# Patient Record
Sex: FEMALE | Race: WHITE
[De-identification: names, ages, dates, MRNs, and addresses within clinical notes are randomized per-mention and may not be internally consistent; named-entity substitution may affect disease eponyms.]

---

## 2019-04-04 ENCOUNTER — HOSPITAL ENCOUNTER (EMERGENCY)
Dept: HOSPITAL 10 - FTE | Age: 34
Discharge: HOME | End: 2019-04-04
Payer: MEDICAID

## 2019-04-04 ENCOUNTER — HOSPITAL ENCOUNTER (EMERGENCY)
Dept: HOSPITAL 91 - FTE | Age: 34
Discharge: HOME | End: 2019-04-04
Payer: MEDICAID

## 2019-04-04 VITALS — RESPIRATION RATE: 16 BRPM | HEART RATE: 93 BPM | DIASTOLIC BLOOD PRESSURE: 65 MMHG | SYSTOLIC BLOOD PRESSURE: 123 MMHG

## 2019-04-04 VITALS
BODY MASS INDEX: 28.09 KG/M2 | BODY MASS INDEX: 28.09 KG/M2 | WEIGHT: 143.08 LBS | HEIGHT: 60 IN | WEIGHT: 143.08 LBS | HEIGHT: 60 IN

## 2019-04-04 DIAGNOSIS — R11.2: Primary | ICD-10-CM

## 2019-04-04 DIAGNOSIS — R10.84: ICD-10-CM

## 2019-04-04 LAB
ADD MAN DIFF?: NO
ADD UMIC: NO
ALANINE AMINOTRANSFERASE: 36 IU/L (ref 13–69)
ALBUMIN/GLOBULIN RATIO: 1.2
ALBUMIN: 4.7 G/DL (ref 3.3–4.9)
ALKALINE PHOSPHATASE: 120 IU/L (ref 42–121)
ANION GAP: 10 (ref 5–13)
ASPARTATE AMINO TRANSFERASE: 24 IU/L (ref 15–46)
BASOPHIL #: 0.1 10^3/UL (ref 0–0.1)
BASOPHILS %: 0.4 % (ref 0–2)
BILIRUBIN,DIRECT: 0 MG/DL (ref 0–0.2)
BILIRUBIN,TOTAL: 0.5 MG/DL (ref 0.2–1.3)
BLOOD UREA NITROGEN: 12 MG/DL (ref 7–20)
CALCIUM: 9.4 MG/DL (ref 8.4–10.2)
CARBON DIOXIDE: 28 MMOL/L (ref 21–31)
CHLORIDE: 104 MMOL/L (ref 97–110)
CREATININE: 0.49 MG/DL (ref 0.44–1)
EOSINOPHILS #: 0.1 10^3/UL (ref 0–0.5)
EOSINOPHILS %: 1.1 % (ref 0–7)
GLOBULIN: 3.9 G/DL (ref 1.3–3.2)
GLUCOSE: 116 MG/DL (ref 70–220)
HEMATOCRIT: 34.9 % (ref 37–47)
HEMOGLOBIN: 10.4 G/DL (ref 12–16)
IMMATURE GRANS #M: 0.06 10^3/UL (ref 0–0.03)
IMMATURE GRANS % (M): 0.5 % (ref 0–0.43)
LIPASE: 11 U/L (ref 23–300)
LYMPHOCYTES #: 1.3 10^3/UL (ref 0.8–2.9)
LYMPHOCYTES %: 11.6 % (ref 15–51)
MEAN CORPUSCULAR HEMOGLOBIN: 21.7 PG (ref 29–33)
MEAN CORPUSCULAR HGB CONC: 29.8 G/DL (ref 32–37)
MEAN CORPUSCULAR VOLUME: 72.7 FL (ref 82–101)
MEAN PLATELET VOLUME: 9.4 FL (ref 7.4–10.4)
MONOCYTE #: 0.8 10^3/UL (ref 0.3–0.9)
MONOCYTES %: 7.3 % (ref 0–11)
NEUTROPHIL #: 9 10^3/UL (ref 1.6–7.5)
NEUTROPHILS %: 79.1 % (ref 39–77)
NUCLEATED RED BLOOD CELLS #: 0 10^3/UL (ref 0–0)
NUCLEATED RED BLOOD CELLS%: 0 /100WBC (ref 0–0)
PLATELET COUNT: 312 10^3/UL (ref 140–415)
POTASSIUM: 4 MMOL/L (ref 3.5–5.1)
RED BLOOD COUNT: 4.8 10^6/UL (ref 4.2–5.4)
RED CELL DISTRIBUTION WIDTH: 17.9 % (ref 11.5–14.5)
SODIUM: 142 MMOL/L (ref 135–144)
TOTAL PROTEIN: 8.6 G/DL (ref 6.1–8.1)
UR ASCORBIC ACID: NEGATIVE MG/DL
UR BILIRUBIN (DIP): NEGATIVE MG/DL
UR BLOOD (DIP): NEGATIVE MG/DL
UR CLARITY: (no result)
UR COLOR: YELLOW
UR GLUCOSE (DIP): NEGATIVE MG/DL
UR KETONES (DIP): (no result) MG/DL
UR LEUKOCYTE ESTERASE (DIP): NEGATIVE LEU/UL
UR MUCUS: (no result) /HPF
UR NITRITE (DIP): NEGATIVE MG/DL
UR PH (DIP): 5 (ref 5–9)
UR RBC: 3 /HPF (ref 0–5)
UR SPECIFIC GRAVITY (DIP): 1.02 (ref 1–1.03)
UR SQUAMOUS EPITHELIAL CELL: (no result) /HPF
UR TOTAL PROTEIN (DIP): NEGATIVE MG/DL
UR UROBILINOGEN (DIP): NEGATIVE MG/DL
UR WBC: 1 /HPF (ref 0–5)
WHITE BLOOD COUNT: 11.4 10^3/UL (ref 4.8–10.8)

## 2019-04-04 PROCEDURE — 36415 COLL VENOUS BLD VENIPUNCTURE: CPT

## 2019-04-04 PROCEDURE — 83690 ASSAY OF LIPASE: CPT

## 2019-04-04 PROCEDURE — 81003 URINALYSIS AUTO W/O SCOPE: CPT

## 2019-04-04 PROCEDURE — 84703 CHORIONIC GONADOTROPIN ASSAY: CPT

## 2019-04-04 PROCEDURE — 99283 EMERGENCY DEPT VISIT LOW MDM: CPT

## 2019-04-04 PROCEDURE — 85025 COMPLETE CBC W/AUTO DIFF WBC: CPT

## 2019-04-04 PROCEDURE — 80053 COMPREHEN METABOLIC PANEL: CPT

## 2019-04-04 PROCEDURE — 81001 URINALYSIS AUTO W/SCOPE: CPT

## 2019-04-04 RX ADMIN — ONDANSETRON 1 MG: 4 TABLET, ORALLY DISINTEGRATING ORAL at 09:03

## 2019-04-04 RX ADMIN — ACETAMINOPHEN 1 MG: 325 TABLET, FILM COATED ORAL at 09:04

## 2019-04-04 NOTE — ERD
ER Documentation


Chief Complaint


Chief Complaint





abdominal pain with heartburn,nausea and vomiting x 2 days





HPI


33-year-old female presents with abdominal pain for last 2 days.  No history of 


fevers.  She points to her umbilical area.  She said one episode of vomiting 


nonbilious nonbloody.  She denies diarrhea, urinary complaints, lower abdominal 


pain.  She denies pregnancy, concern for STDs.  She has a history of tubal 


ligation.





ROS


All systems reviewed and are negative except as per history of present illness.





Medications


Home Meds


Active Scripts


Ondansetron (Ondansetron Odt) 8 Mg Tab.rapdis, 8 MG PO Q6H PRN for NAUSEA AND/OR


VOMITING, #8 TAB


   Prov:MERLY HAAS MD         4/4/19


Acetaminophen* (Tylophen*) 500 Mg Capsule, 1 CAP PO Q6H PRN for PAIN AND OR 


ELEVATED TEMP, #15 CAP


   Prov:MERLY HAAS MD         4/4/19





Allergies


Allergies:  


Coded Allergies:  


     No Known Allergy (Unverified , 4/4/19)





PMhx/Soc


Medical and Surgical Hx:  pt denies Medical Hx


History of Surgery:  Yes (Tubal Ligation)


Anesthesia Reaction:  No


Hx Alcohol Use:  No


Hx Substance Use:  No


Hx Tobacco Use:  No


Smoking Status:  Never smoker





FmHx


Family History:  No diabetes, No coronary disease, No other





Physical Exam


Vitals





Vital Signs


  Date      Temp  Pulse  Resp  B/P (MAP)   Pulse Ox  O2          O2 Flow    FiO2


Time                                                 Delivery    Rate


    4/4/19  98.0     93    16      123/65       100


     08:24                           (84)





Physical Exam


Const:   No acute distress


Head:   Atraumatic 


Eyes:    Normal Conjunctiva


ENT:    Normal External Ears, Nose and Mouth.


Neck:               Full range of motion. No meningismus.


Resp:   Clear to auscultation bilaterally


Cardio:   Regular rate and rhythm, no murmurs


Abd:    Soft, no tenderness in the midline periumbilical area.  No tenderness at


McBurney's point no Watson sign.  No peritoneal signs.  Non distended. Normal 


bowel sounds


Skin:   No petechiae or rashes


Back:   No midline or flank tenderness


Ext:    No cyanosis, or edema


Neur:   Awake and alert


Psych:    Normal Mood and Affect


Result Diagram:  


4/4/19 0908                                                                     


          4/4/19 0908





Results 24 hrs





Laboratory Tests


      Test
                                   4/4/19
09:03    4/4/19
09:08


      Urine Color                          YELLOW


      Urine Clarity
                       SLIGHTLY
CLOUDY  



      Urine pH                                        5.0


      Urine Specific Gravity                        1.019


      Urine Ketones                        TRACE mg/dL


      Urine Nitrite                        NEGATIVE mg/dL


      Urine Bilirubin                      NEGATIVE mg/dL


      Urine Urobilinogen                   NEGATIVE mg/dL


      Urine Leukocyte Esterase
            NEGATIVE
Jhonny/ul  



      Urine Microscopic RBC                        3 /HPF


      Urine Microscopic WBC                        1 /HPF


      Urine Squamous Epithelial
Cells      FEW /HPF 
       



      Urine Mucus                          FEW /HPF


      Urine Hemoglobin                     NEGATIVE mg/dL


      Urine Glucose                        NEGATIVE mg/dL


      Urine Total Protein                  NEGATIVE mg/dl


      Urine Pregnancy Test                 NEGATIVE


      White Blood Count                                      11.4 10^3/ul


      Red Blood Count                                        4.80 10^6/ul


      Hemoglobin                                                10.4 g/dl


      Hematocrit                                                   34.9 %


      Mean Corpuscular Volume                                     72.7 fl


      Mean Corpuscular Hemoglobin                                 21.7 pg


      Mean Corpuscular Hemoglobin
Concent  
                   29.8 g/dl 



      Red Cell Distribution Width                                  17.9 %


      Platelet Count                                          312 10^3/UL


      Mean Platelet Volume                                         9.4 fl


      Immature Granulocytes %                                     0.500 %


      Neutrophils %                                                79.1 %


      Lymphocytes %                                                11.6 %


      Monocytes %                                                   7.3 %


      Eosinophils %                                                 1.1 %


      Basophils %                                                   0.4 %


      Nucleated Red Blood Cells %                             0.0 /100WBC


      Immature Granulocytes #                               0.060 10^3/ul


      Neutrophils #                                           9.0 10^3/ul


      Lymphocytes #                                           1.3 10^3/ul


      Monocytes #                                             0.8 10^3/ul


      Eosinophils #                                           0.1 10^3/ul


      Basophils #                                             0.1 10^3/ul


      Nucleated Red Blood Cells #                             0.0 10^3/ul


      Sodium Level                                             142 mmol/L


      Potassium Level                                          4.0 mmol/L


      Chloride Level                                           104 mmol/L


      Carbon Dioxide Level                                      28 mmol/L


      Anion Gap                                                        10


      Blood Urea Nitrogen                                        12 mg/dl


      Creatinine                                               0.49 mg/dl


      Est Glomerular Filtrat Rate
mL/min   
                > 60 mL/min 



      Glucose Level                                             116 mg/dl


      Calcium Level                                             9.4 mg/dl


      Total Bilirubin                                           0.5 mg/dl


      Direct Bilirubin                                         0.00 mg/dl


      Indirect Bilirubin                                        0.5 mg/dl


      Aspartate Amino Transf
(AST/SGOT)    
                     24 IU/L 



      Alanine Aminotransferase
(ALT/SGPT)  
                     36 IU/L 



      Alkaline Phosphatase                                       120 IU/L


      Total Protein                                              8.6 g/dl


      Albumin                                                    4.7 g/dl


      Globulin                                                  3.90 g/dl


      Albumin/Globulin Ratio                                         1.20


      Lipase                                                       11 U/L





Current Medications


 Medications
   Dose
          Sig/Sharon
       Start Time
   Status  Last


 (Trade)       Ordered        Route
 PRN     Stop Time              Admin
Dose


                              Reason                                Admin


                650 mg         ONCE  ONCE
    4/4/19        DC            4/4/19


Acetaminophen                 PO
            09:00
 4/4/19                09:04




  (Tylenol                                  09:01


Tab)


 Ondansetron    8 mg           ONCE  STAT
    4/4/19        DC            4/4/19


HCl
  (Zofran                 ODT
           08:59
 4/4/19                09:03



Odt)                                         09:00








Procedures/MDM


CBC shows minimal leukocytosis.  CMP is normal.  Urine shows no findings of 


infection or additional concerning acute abnormalities.  HCG negative.  Patient 


was given Zofran and Tylenol.  Serial exam shows that pain is completely 


resolved and patient had a benign abdomen.  Patient presents with vomiting and 


mid abdominal pain for last 2 days.  She has no vomiting today.  Her symptoms 


are resolved with Tylenol and Zofran.  Patient may have viral gastroenteritis 


although early appendicitis certainly a consideration.  He will be discharged 


home with Tylenol, Zofran, close observation return precautions in the next 8-12


hours for lower abdominal pain, vomiting despite treatment, fevers, new 


worsening symptoms.  The patient was stable with no new complaints during the ER


course. Clinically, there is no current evidence to suggest meningitis, sepsis, 


acute abdomen, pneumonia, stroke,  acute coronary syndrome, pulmonary embolism, 


aortic dissection or any other emergent condition appearing to require further 


evaluation or hospitalization.  Patient counseled regarding my diagnostic 


impression and care plan. Prior to discharge all questions answered. Pt agrees 


with treatment plan and understands strict return precautions. Pt is instructed 


to follow up with primary care provider within 24-48 hours. Precautionary 


instructions provided including instructions to return to the ER if not 


improving or for any worsening or changing symptoms or concerns.





Departure


Diagnosis:  


   Primary Impression:  


   Vomiting


   Vomiting type:  unspecified  Vomiting Intractability:  unspecified  Nausea 


   presence:  unspecified  Qualified Codes:  R11.10 - Vomiting, unspecified


   Additional Impression:  


   Abdominal pain


   Abdominal location:  generalized  Qualified Codes:  R10.84 - Generalized 


   abdominal pain


Condition:  Stable


Patient Instructions:  Abdominal Pain, Abdominal Pain, Possible Appendicitis 


(Female), Vomiting (7Y-Adult)





Additional Instructions:  


Horita los examines dice no tiene appendicits o emferma mal, key es importante 


coma esta en el proximo sabino. chequ 8-12 horas par mas dolor, especialamente 


derecho y abajo vomito , fiebre, nueva simptomas.











MERLY HAAS MD              Apr 4, 2019 10:27